# Patient Record
Sex: FEMALE | Race: WHITE | URBAN - METROPOLITAN AREA
[De-identification: names, ages, dates, MRNs, and addresses within clinical notes are randomized per-mention and may not be internally consistent; named-entity substitution may affect disease eponyms.]

---

## 2017-08-22 ENCOUNTER — OFFICE VISIT (OUTPATIENT)
Dept: DERMATOLOGY | Age: 37
End: 2017-08-22

## 2017-08-22 DIAGNOSIS — L81.1 MELASMA: ICD-10-CM

## 2017-08-22 DIAGNOSIS — D22.9 MULTIPLE NEVI: Primary | ICD-10-CM

## 2017-08-22 DIAGNOSIS — D48.5 NEOPLASM OF UNCERTAIN BEHAVIOR OF SKIN: ICD-10-CM

## 2017-08-22 DIAGNOSIS — L70.0 ACNE VULGARIS: ICD-10-CM

## 2017-08-22 PROCEDURE — 99203 OFFICE O/P NEW LOW 30 MIN: CPT | Performed by: DERMATOLOGY

## 2017-08-22 PROCEDURE — 11100 PR BIOPSY OF SKIN LESION: CPT | Performed by: DERMATOLOGY

## 2017-08-22 RX ORDER — DAPSONE 50 MG/G
GEL TOPICAL
Qty: 30 G | Refills: 5 | Status: SHIPPED | OUTPATIENT
Start: 2017-08-22 | End: 2020-02-13

## 2017-08-22 RX ORDER — NORETHINDRONE ACETATE AND ETHINYL ESTRADIOL 1.5-30(21)
KIT ORAL
COMMUNITY
Start: 2017-07-26 | End: 2020-02-13

## 2017-08-22 RX ORDER — SPIRONOLACTONE 100 MG/1
100 TABLET, FILM COATED ORAL DAILY
COMMUNITY
End: 2018-05-15 | Stop reason: SDUPTHER

## 2017-08-22 RX ORDER — ESCITALOPRAM OXALATE 20 MG/1
20 TABLET ORAL DAILY
COMMUNITY

## 2017-08-23 ENCOUNTER — TELEPHONE (OUTPATIENT)
Dept: DERMATOLOGY | Age: 37
End: 2017-08-23

## 2017-08-28 ENCOUNTER — TELEPHONE (OUTPATIENT)
Dept: DERMATOLOGY | Age: 37
End: 2017-08-28

## 2017-11-09 ENCOUNTER — OFFICE VISIT (OUTPATIENT)
Dept: DERMATOLOGY | Age: 37
End: 2017-11-09

## 2017-11-09 DIAGNOSIS — D22.5 COMPOUND NEVUS OF CHEST: Primary | ICD-10-CM

## 2017-11-09 PROCEDURE — MISCSHVSIM COSMETIC SHAVE REMOVAL OF BENIGN LESION - SIMPLE: Performed by: DERMATOLOGY

## 2017-11-09 NOTE — PROGRESS NOTES
Seizures ([facility] [address]      History reviewed. No pertinent surgical history. Physical Examination     Gen, well-appearing    Chest with 2 light brown ~ 4 mm papules pictured below            Assessment and Plan     1. Central chest and R chest  - simple elective shave removal x 2 performed after verbal consent obtained. Patient educated regarding risk of bleeding, infection, scar and educated on wound care. Skin cleansed with alcohol pad and site anesthetized with lido + epi. Aluminum chloride applied to site for hemostasis. Petrolatum ointment and bandage applied. Specimen bottle labeled with patient information and site and specimen sent to [facility].    - limit sun exposure     *aczone to carepoint if needed for acne

## 2017-11-15 ENCOUNTER — TELEPHONE (OUTPATIENT)
Dept: DERMATOLOGY | Age: 37
End: 2017-11-15

## 2018-05-14 ENCOUNTER — TELEPHONE (OUTPATIENT)
Dept: DERMATOLOGY | Age: 38
End: 2018-05-14

## 2018-05-14 DIAGNOSIS — Z51.81 MEDICATION MONITORING ENCOUNTER: Primary | ICD-10-CM

## 2018-05-15 RX ORDER — SPIRONOLACTONE 100 MG/1
100 TABLET, FILM COATED ORAL DAILY
Qty: 90 TABLET | Refills: 0 | Status: SHIPPED | OUTPATIENT
Start: 2018-05-15 | End: 2018-08-21 | Stop reason: SDUPTHER

## 2018-08-20 ENCOUNTER — TELEPHONE (OUTPATIENT)
Dept: DERMATOLOGY | Age: 38
End: 2018-08-20

## 2018-08-21 ENCOUNTER — TELEPHONE (OUTPATIENT)
Dept: DERMATOLOGY | Age: 38
End: 2018-08-21

## 2018-08-21 LAB
ALBUMIN SERPL-MCNC: 4.2 G/DL (ref 3.4–5)
ANION GAP SERPL CALCULATED.3IONS-SCNC: 14 MMOL/L (ref 3–16)
BUN BLDV-MCNC: 11 MG/DL (ref 7–20)
CALCIUM SERPL-MCNC: 9.3 MG/DL (ref 8.3–10.6)
CHLORIDE BLD-SCNC: 102 MMOL/L (ref 99–110)
CO2: 24 MMOL/L (ref 21–32)
CREAT SERPL-MCNC: 0.7 MG/DL (ref 0.6–1.1)
GFR AFRICAN AMERICAN: >60
GFR NON-AFRICAN AMERICAN: >60
GLUCOSE BLD-MCNC: 98 MG/DL (ref 70–99)
PHOSPHORUS: 2.4 MG/DL (ref 2.5–4.9)
POTASSIUM SERPL-SCNC: 4.5 MMOL/L (ref 3.5–5.1)
SODIUM BLD-SCNC: 140 MMOL/L (ref 136–145)

## 2018-08-21 RX ORDER — SPIRONOLACTONE 100 MG/1
100 TABLET, FILM COATED ORAL DAILY
Qty: 90 TABLET | Refills: 1 | Status: SHIPPED | OUTPATIENT
Start: 2018-08-21 | End: 2018-08-27 | Stop reason: SDUPTHER

## 2018-08-24 NOTE — TELEPHONE ENCOUNTER
Mattie Mena said that the spirolactone medication is on back order. [facility] closed out her prescription. She is asking if there is another medication that is similar?

## 2018-08-27 RX ORDER — SPIRONOLACTONE 100 MG/1
100 TABLET, FILM COATED ORAL DAILY
Qty: 30 TABLET | Refills: 1 | Status: SHIPPED | OUTPATIENT
Start: 2018-08-27 | End: 2018-10-22 | Stop reason: SDUPTHER

## 2018-10-22 RX ORDER — SPIRONOLACTONE 100 MG/1
100 TABLET, FILM COATED ORAL DAILY
Qty: 30 TABLET | Refills: 1 | Status: SHIPPED | OUTPATIENT
Start: 2018-10-22 | End: 2018-11-08 | Stop reason: SDUPTHER

## 2018-11-08 ENCOUNTER — OFFICE VISIT (OUTPATIENT)
Dept: DERMATOLOGY | Age: 38
End: 2018-11-08
Payer: COMMERCIAL

## 2018-11-08 DIAGNOSIS — D22.9 MULTIPLE NEVI: Primary | ICD-10-CM

## 2018-11-08 DIAGNOSIS — Z51.81 MEDICATION MONITORING ENCOUNTER: ICD-10-CM

## 2018-11-08 DIAGNOSIS — D22.62: ICD-10-CM

## 2018-11-08 DIAGNOSIS — L70.0 ACNE VULGARIS: ICD-10-CM

## 2018-11-08 PROCEDURE — 11300 SHAVE SKIN LESION 0.5 CM/<: CPT | Performed by: DERMATOLOGY

## 2018-11-08 PROCEDURE — 99214 OFFICE O/P EST MOD 30 MIN: CPT | Performed by: DERMATOLOGY

## 2018-11-08 RX ORDER — SPIRONOLACTONE 100 MG/1
100 TABLET, FILM COATED ORAL DAILY
Qty: 90 TABLET | Refills: 3 | Status: SHIPPED | OUTPATIENT
Start: 2018-11-08 | End: 2019-11-14 | Stop reason: SDUPTHER

## 2018-11-08 RX ORDER — BUPROPION HYDROCHLORIDE 150 MG/1
TABLET ORAL
Refills: 2 | COMMUNITY
Start: 2018-10-15

## 2018-11-12 LAB — DERMATOLOGY PATHOLOGY REPORT: NORMAL

## 2019-11-14 ENCOUNTER — OFFICE VISIT (OUTPATIENT)
Dept: DERMATOLOGY | Age: 39
End: 2019-11-14
Payer: COMMERCIAL

## 2019-11-14 DIAGNOSIS — Z51.81 MEDICATION MONITORING ENCOUNTER: Primary | ICD-10-CM

## 2019-11-14 DIAGNOSIS — R23.8 PAPULE OF SKIN: ICD-10-CM

## 2019-11-14 DIAGNOSIS — D22.9 MULTIPLE NEVI: ICD-10-CM

## 2019-11-14 DIAGNOSIS — L70.0 ACNE VULGARIS: ICD-10-CM

## 2019-11-14 PROCEDURE — 99214 OFFICE O/P EST MOD 30 MIN: CPT | Performed by: DERMATOLOGY

## 2019-11-14 RX ORDER — SPIRONOLACTONE 100 MG/1
100 TABLET, FILM COATED ORAL DAILY
Qty: 90 TABLET | Refills: 2 | Status: SHIPPED | OUTPATIENT
Start: 2019-11-14 | End: 2020-11-23 | Stop reason: SDUPTHER

## 2020-01-15 ENCOUNTER — TELEPHONE (OUTPATIENT)
Dept: DERMATOLOGY | Age: 40
End: 2020-01-15

## 2020-01-22 NOTE — TELEPHONE ENCOUNTER
Patient scheduled to have lesion on neck re-evaluated after treating area with soolantra as suggested.

## 2020-02-13 ENCOUNTER — OFFICE VISIT (OUTPATIENT)
Dept: DERMATOLOGY | Age: 40
End: 2020-02-13
Payer: COMMERCIAL

## 2020-02-13 PROCEDURE — 99212 OFFICE O/P EST SF 10 MIN: CPT | Performed by: DERMATOLOGY

## 2020-02-13 PROCEDURE — 11900 INJECT SKIN LESIONS </W 7: CPT | Performed by: DERMATOLOGY

## 2020-02-13 PROCEDURE — 11104 PUNCH BX SKIN SINGLE LESION: CPT | Performed by: DERMATOLOGY

## 2020-02-13 RX ORDER — TRETINOIN 0.4 MG/G
0.05 GEL TOPICAL NIGHTLY
COMMUNITY

## 2020-02-13 NOTE — PROGRESS NOTES
[facility]  Billy Morel MD  [phone]      Allison Gonzalez  [birth year]    46 y.o. female     Date of Visit: 2/13/2020    Last seen:     Chief Complaint: lesion, acne  Chief Complaint   Patient presents with    Skin Lesion     remove spot right neck    Skin Lesion     spot under left nostril- ILK ? History of Present Illness:    1. Here for a papule on the R neck that has been present for the past few mos, noted at last visit but had been manipulated. Concerned b/c hasn't cleared and she has not been touching it. Asx.     2. F/u for Acne breakouts on the face x many years, mainly chin. On Spironolactone 100 mg daily (has been on consistently for a few years; d/c in past with pregnancies) - helps significantly. See previous note. C/o tender cystic papule in the L infranasal area that flared a week or so ago and has persisted. Hx of melasma. No personal hx of skin cancer. Mom with hx of skin cancer - likely NMSC but not sure of type. Wears sunscreen regularly. Has 2 children - boy and girl - 5 and 3. Probably done having kids. Son started at [address] this year; daughter goes to [employer]. Friends with Esa Koberadha and went to [employer] with [place]. Review of Systems:  Gen: Feels well, good sense of health. Skin: No changing moles or lesions. Past Medical History, Family History, Surgical History, Medications and Allergies reviewed. Outpatient Medications Marked as Taking for the 2/13/20 encounter (Office Visit) with Nicholas Hill MD   Medication Sig Dispense Refill    tretinoin microspheres (RETIN-A MICRO) 0.04 % gel Apply 0.05 % topically nightly Apply topically nightly.       spironolactone (ALDACTONE) 100 MG tablet Take 1 tablet by mouth daily 90 tablet 2    Levonorgestrel (KYLEENA IU) by Intrauterine route      buPROPion (WELLBUTRIN XL) 150 MG extended release tablet TK 1 T PO D  2    escitalopram (LEXAPRO) 20 MG tablet

## 2020-02-17 LAB — DERMATOLOGY PATHOLOGY REPORT: NORMAL

## 2020-09-29 ENCOUNTER — TELEPHONE (OUTPATIENT)
Dept: DERMATOLOGY | Age: 40
End: 2020-09-29

## 2020-09-29 NOTE — TELEPHONE ENCOUNTER
Dr Guillaume Gamez patient  Pt [facility] c/b #[phone]  Pt stated  Is having a bad acne breakout. Pt wanted to know could dr Guillaume Gamez get her in for a cortisone shot.   Please c/b to discuss

## 2020-09-30 NOTE — TELEPHONE ENCOUNTER
Stress is  through the roof currently-chin is the worst started Sunday-Monday. 2 wks    Patient would like to get in for an [address] for the area on her chin tomorrow? I offered patient an appt on 10/6/2020, but she did not want to wait.

## 2020-10-01 ENCOUNTER — OFFICE VISIT (OUTPATIENT)
Dept: DERMATOLOGY | Age: 40
End: 2020-10-01
Payer: COMMERCIAL

## 2020-10-01 VITALS — TEMPERATURE: 97.3 F

## 2020-10-01 PROCEDURE — 99212 OFFICE O/P EST SF 10 MIN: CPT | Performed by: DERMATOLOGY

## 2020-10-01 PROCEDURE — 11900 INJECT SKIN LESIONS </W 7: CPT | Performed by: DERMATOLOGY

## 2020-10-01 NOTE — PROGRESS NOTES
[facility]  Ankit Gupta MD  [phone]      Fidel Holman  [birth year]    45 y.o. female     Date of Visit: 10/1/2020    Last seen:     Chief Complaint: acne  Chief Complaint   Patient presents with    Acne     chin flaring     History of Present Illness:    1. Acne breakouts on the face x many years, mainly chin. On Spironolactone 100 mg daily (has been on consistently for a few years; d/c in past with pregnancies) - helps significantly. C/o tender cystic papules on the L lower chin and R chin that flared a week or so ago and has persisted. Hx of melasma. No personal hx of skin cancer. Mom with hx of skin cancer - likely NMSC but not sure of type. Wears sunscreen regularly. Has 2 children - boy and girl - 10 and 4. Probably done having kids. Son started at [address] in 2019; daughter goes to [employer]. Friends with Martina Darby and went to [employer] with [place]. Review of Systems:  Gen: Feels well, good sense of health. Skin: No changing moles or lesions. Past Medical History, Family History, Surgical History, Medications and Allergies reviewed. Outpatient Medications Marked as Taking for the 10/1/20 encounter (Office Visit) with Casandra Ling MD   Medication Sig Dispense Refill    tretinoin microspheres (RETIN-A MICRO) 0.04 % gel Apply 0.05 % topically nightly Apply topically nightly.  spironolactone (ALDACTONE) 100 MG tablet Take 1 tablet by mouth daily 90 tablet 2    Levonorgestrel (KYLEENA IU) by Intrauterine route      buPROPion (WELLBUTRIN XL) 150 MG extended release tablet TK 1 T PO D  2    escitalopram (LEXAPRO) 20 MG tablet Take 20 mg by mouth daily       No Known Allergies    Past Medical History:   Diagnosis Date    Depression     Seizures ([facility] [address]      History reviewed. No pertinent surgical history.     Physical Examination     Gen, well-appearing    Lower face/perioral area with erythematous variable eroded papules and few papulonodules - R chin and L lower chin            Previous shave sites:          Assessment and Plan     1.  Acne papules - flaring - 2  - IL Kenalog 5 mg/cc   Total 0.15 cc injection to 2 lesions   Ed risk atrophy and dyspigmentation   - cont spirono, tretinoin, aczone     Previously addressed:   - Melasma, mild - triluma  - S/p shave removal of benign nevi on the Central chest and R chest    *aczone to carepoint if needed for acne

## 2020-11-16 ENCOUNTER — OFFICE VISIT (OUTPATIENT)
Dept: DERMATOLOGY | Age: 40
End: 2020-11-16
Payer: COMMERCIAL

## 2020-11-16 VITALS — TEMPERATURE: 97.2 F

## 2020-11-16 PROCEDURE — 99214 OFFICE O/P EST MOD 30 MIN: CPT | Performed by: DERMATOLOGY

## 2020-11-16 NOTE — PROGRESS NOTES
[facility]  Марина Wellington MD  [phone]      Ethan Howard  2/8/1806    45 y.o. female     Date of Visit: 11/16/2020    Last seen:     Chief Complaint: moles, f/u acne  Chief Complaint   Patient presents with    Skin Exam     History of Present Illness:    1. Here for evaluation of multiple asx pigmented lesions on the trunk and extremities, present for many years; no change in size/shape/color of any lesions; no bleeding lesions. 2. F/u for Acne breakouts on the face x many years, mainly chin. Status post intralesional Kenalog for a few large tender cystic nodules a month ago-these have completely resolved. She has a few milder breakouts along the left jawline currently. On Spironolactone 100 mg daily (has been on consistently for a few years; d/c in past with pregnancies) - helps significantly. No side effects. Using tretinoin, aczone. Has tried doxy, juan antonio in the past; BP products, topical clinda. No hx of isotretinoin. On OCP's. Was on depo shot in the past; no association of flares with periods. Hx of melasma. No personal hx of skin cancer. Mom with hx of skin cancer - likely NMSC but not sure of type. Wears sunscreen regularly. Has 2 children - boy and girl - 10 and 4. Probably done having kids. Son started at [address] this year; daughter goes to [employer]. Friends with [facility] and went to [employer] with [place]. Review of Systems:  Gen: Feels well, good sense of health. Skin: No changing moles or lesions. Past Medical History, Family History, Surgical History, Medications and Allergies reviewed. Outpatient Medications Marked as Taking for the 11/16/20 encounter (Office Visit) with Octavio Merida MD   Medication Sig Dispense Refill    tretinoin microspheres (RETIN-A MICRO) 0.04 % gel Apply 0.05 % topically nightly Apply topically nightly.       spironolactone (ALDACTONE) 100 MG tablet Take 1 tablet by mouth daily 90 tablet 2    Levonorgestrel (KYLEENA IU) by Intrauterine route      buPROPion (WELLBUTRIN XL) 150 MG extended release tablet TK 1 T PO D  2    escitalopram (LEXAPRO) 20 MG tablet Take 20 mg by mouth daily       No Known Allergies    Past Medical History:   Diagnosis Date    Depression     Seizures ([facility] [address]      History reviewed. No pertinent surgical history. Physical Examination     Gen, well-appearing    The following were examined and determined to be normal: Psych/Neuro, Scalp/hair, Conjunctivae/eyelids, Gums/teeth/lips, Breast/axilla/chest, Abdomen, Back, RUE, RLE, LLE, Nails/digits and buttocks. LUE. neck. The following were examined and determined to be abnormal: face    trunk and extremities with scattered brown macules and papules   Face clear except for 2 erythematous papules along the left jawline    Scars - subtle, smooth    Previous shave sites:          Assessment and Plan     1. Benign-appearing nevi  - educ re ABCD's of MM   educ sun protection   encouraged skin check yearly (sooner if indicated), self checks     2.  Acne, minimal today  - cont spironolactone 100 mg daily   educ risk K abnl, breast tenderness, dizziness, abnl menses   - cont tretinoin, aczone  - do not get pregnant  - K annually - due now  - consider juan antonio short-term if needed - call if flaring or can see for ILK for acute larger lesions     Previously addressed:  - Melasma, mild - triluma  - S/p shave removal of benign nevi on the Central chest and R chest    *aczone to carepoint if needed for acne

## 2020-11-20 DIAGNOSIS — Z51.81 MEDICATION MONITORING ENCOUNTER: ICD-10-CM

## 2020-11-20 LAB
ALBUMIN SERPL-MCNC: 4.4 G/DL (ref 3.4–5)
ANION GAP SERPL CALCULATED.3IONS-SCNC: 10 MMOL/L (ref 3–16)
BUN BLDV-MCNC: 15 MG/DL (ref 7–20)
CALCIUM SERPL-MCNC: 9.1 MG/DL (ref 8.3–10.6)
CHLORIDE BLD-SCNC: 103 MMOL/L (ref 99–110)
CO2: 24 MMOL/L (ref 21–32)
CREAT SERPL-MCNC: 0.7 MG/DL (ref 0.6–1.1)
GFR AFRICAN AMERICAN: >60
GFR NON-AFRICAN AMERICAN: >60
GLUCOSE BLD-MCNC: 95 MG/DL (ref 70–99)
PHOSPHORUS: 3.8 MG/DL (ref 2.5–4.9)
POTASSIUM SERPL-SCNC: 4.1 MMOL/L (ref 3.5–5.1)
SODIUM BLD-SCNC: 137 MMOL/L (ref 136–145)

## 2020-11-23 RX ORDER — SPIRONOLACTONE 100 MG/1
100 TABLET, FILM COATED ORAL DAILY
Qty: 90 TABLET | Refills: 3 | Status: SHIPPED | OUTPATIENT
Start: 2020-11-23 | End: 2021-11-01

## 2021-02-13 ENCOUNTER — PATIENT MESSAGE (OUTPATIENT)
Dept: DERMATOLOGY | Age: 41
End: 2021-02-13

## 2021-02-15 RX ORDER — MINOCYCLINE HYDROCHLORIDE 50 MG/1
CAPSULE ORAL
Qty: 60 CAPSULE | Refills: 0 | Status: SHIPPED | OUTPATIENT
Start: 2021-02-15

## 2021-02-15 NOTE — TELEPHONE ENCOUNTER
From: Princess Client  To: Joe Joyner MD  Sent: 2/13/2021 8:15 AM EST  Subject: Prescription Question    I am having a very severe cystic acne flare up. Dr. Wil Kothari said I can contact for an oral antibiotic if this happens. Please let me know if this is possible.   THank you,  Josette Bo

## 2021-02-15 NOTE — TELEPHONE ENCOUNTER
11/19/2020:  Acne, minimal today  - cont spironolactone 100 mg daily   educ risk K abnl, breast tenderness, dizziness, abnl menses   - cont tretinoin, aczone  - do not get pregnant  - K annually - due now  - consider juan antonio short-term if needed - call if flaring or can see for ILK for acute larger lesions

## 2021-07-20 ENCOUNTER — OFFICE VISIT (OUTPATIENT)
Dept: DERMATOLOGY | Age: 41
End: 2021-07-20
Payer: COMMERCIAL

## 2021-07-20 VITALS — TEMPERATURE: 97.3 F

## 2021-07-20 DIAGNOSIS — L81.4 LENTIGINES: ICD-10-CM

## 2021-07-20 DIAGNOSIS — D22.9 MULTIPLE NEVI: ICD-10-CM

## 2021-07-20 DIAGNOSIS — L70.0 ACNE VULGARIS: ICD-10-CM

## 2021-07-20 DIAGNOSIS — Z51.81 MEDICATION MONITORING ENCOUNTER: ICD-10-CM

## 2021-07-20 DIAGNOSIS — I83.811 VARICOSE VEINS OF RIGHT LOWER EXTREMITY WITH PAIN: Primary | ICD-10-CM

## 2021-07-20 PROCEDURE — 99214 OFFICE O/P EST MOD 30 MIN: CPT | Performed by: DERMATOLOGY

## 2021-07-20 NOTE — PROGRESS NOTES
[facility]  Jorden Loving MD  [phone]      Kathi Gonzales  [birth year]    37 y.o. female     Date of Visit: 7/20/2021    Last seen:     Chief Complaint: moles, f/u acne  Chief Complaint   Patient presents with    Skin Exam     History of Present Illness:    1. Here for evaluation of multiple asx pigmented lesions on the trunk and extremities, present for many years; no change in size/shape/color of any lesions; no bleeding lesions. 2. F/u for Acne breakouts on the face x many years, mainly chin. Status post intralesional Kenalog for a few large tender cystic nodules in the past-these have completely resolved and no recent. She has a few milder breakouts along the left jawline currently. On Spironolactone 100 mg daily (has been on consistently for a few years; d/c in past with pregnancies) - helps significantly. No side effects. Using tretinoin regularly, aczone intermittently. Has tried doxy, juan antonio in the past; BP products, topical clinda. No hx of isotretinoin. Was On OCP's but now has IUD. Was on depo shot in the past; no association of flares with periods. 3. C/o bulging intermittently achy compressible nodule on the R shin. Bulges more with standing. No trx tried. Hx of melasma. No personal hx of skin cancer. Mom with hx of skin cancer - likely NMSC but not sure of type. Wears sunscreen regularly. Has 2 children - boy and girl. Probably done having kids. Daughter starting at [employer] this year; daughter goes to [employer]. Friends with Jai Alejo and went to [employer] with [place]. Review of Systems:  Gen: Feels well, good sense of health. Skin: No changing moles or lesions. Past Medical History, Family History, Surgical History, Medications and Allergies reviewed.     Outpatient Medications Marked as Taking for the 7/20/21 encounter (Office Visit) with Claudia Serra MD   Medication Sig Dispense Refill   Doss[facility]c Ya spironolactone (ALDACTONE) 100 MG tablet Take 1 tablet by mouth daily 90 tablet 3    Levonorgestrel (KYLEENA IU) by Intrauterine route      buPROPion (WELLBUTRIN XL) 150 MG extended release tablet TK 1 T PO D  2    escitalopram (LEXAPRO) 20 MG tablet Take 20 mg by mouth daily       Allergies   Allergen Reactions    Bactrim [Sulfamethoxazole-Trimethoprim]      H/A, dizziness, flu-like symptoms       Past Medical History:   Diagnosis Date    Depression     Seizures ([facility] [address]      History reviewed. No pertinent surgical history. Physical Examination     Gen, well-appearing    FSE done except for underwear-covered areas    trunk and extremities with scattered brown macules and papules   Face clear except for few small erythematous papules along the left jawline  R lateral shin with compressible ~ 1 cm subtle nodule    Scars - subtle, smooth    Previous shave sites:          Assessment and Plan     1. Benign-appearing nevi and few lentigines  - educ re ABCD's of MM   educ sun protection SPF 30+  encouraged skin check yearly (sooner if indicated), self checks     2. Acne, minimal today  - cont spironolactone 100 mg daily   educ risk K abnl, breast tenderness, dizziness, abnl menses   - cont tretinoin, aczone  - do not get pregnant  - K reviewed - rpt annually - due   - consider juan antonio short-term if needed - call if flaring or can see for ILK for acute larger lesions    3.  C/w early varicosity - R shin, symptomatic  - referral placed to vascular - Dr. Gilles Villatoro or Renee Ramirez for eval/trx  - discussed need for more urgent eval if worsening pain, erythema, swelling, other changes    Also discussed: Angiomas - rtn for [facility] in the winter     Previously addressed:  - Melasma, mild - triluma  - S/p shave removal of benign nevi on the Central chest and R chest     *aczone to carepoint if needed for acne

## 2021-10-15 ENCOUNTER — OFFICE VISIT (OUTPATIENT)
Dept: VASCULAR SURGERY | Age: 41
End: 2021-10-15
Payer: COMMERCIAL

## 2021-10-15 VITALS — HEIGHT: 68 IN | BODY MASS INDEX: 25.01 KG/M2 | WEIGHT: 165 LBS | TEMPERATURE: 97.4 F

## 2021-10-15 DIAGNOSIS — M62.89 HERNIA OF MUSCLE THROUGH FASCIA OF LOWER LEG: Primary | ICD-10-CM

## 2021-10-15 PROCEDURE — 99203 OFFICE O/P NEW LOW 30 MIN: CPT | Performed by: SURGERY

## 2021-10-15 ASSESSMENT — ENCOUNTER SYMPTOMS
CONSTIPATION: 1
RESPIRATORY NEGATIVE: 1
ALLERGIC/IMMUNOLOGIC NEGATIVE: 1
EYES NEGATIVE: 1

## 2021-10-15 NOTE — Clinical Note
Arielle Kumar in our [facility] office today for evaluation of her right leg. On examination she has no evidence of varicosities or spider veins. She does however have a small myofascial defect over the anterior compartment responsible for the bulge identified. I would recommend no further work-up or intervention but I have suggest that she consider wearing over-the-counter compression stockings or sleeve to control it and maintain gentle pressure. She understands will return to see me in future only as needed. If you have any questions please will free to contact me.   Thanks for asked me to see her and please let me know if I can help you with any other patients in the future    Zakia Brady

## 2021-10-15 NOTE — LETTER
[facility]  [facility]  Phone: [phone]  Fax: [phone]    Augusta Boswell MD    October 15, 2021     [address] [place] [id number]    Patient: Pamela Reyes   MR Number: [id number]   YOB: 1980   Date of Visit: 10/15/2021       Dear Syed Faith:    Thank you for referring Pamela Reyes to me for evaluation/treatment. Below are the relevant portions of my assessment and plan of care. Myofascial defect R calf - mild symptomatology  No evidence of venous pathology    Wear OTC compression sleeve of stocking for symptom control and prevention of prpgression. No further vascular workup needed. Discussed and described in detail. Patient understands with all questions answered. If you have questions, please do not hesitate to call me. I look forward to following Selene Carrillo along with you.     Sincerely,      Augusta Boswell MD

## 2021-10-15 NOTE — PROGRESS NOTES
Subjective:      Patient ID: Elin Hart is a 37 y.o. female. HPI Referral from Josselyn Bo MD for evaluation of an area on the right anterior shin area that bulges and is tender to touch. Physician and patient are concerned that this represents varicose vein. Patient denies any achiness or throbbing sensation in that area. Has been present for several years. No previous venous interventions. No family history of varicose veins. No history of SVT, bleeding, ulceration, skin changes or edema. Past Medical History:   Diagnosis Date    Depression     Seizures ([address]      No past surgical history on file. Allergies   Allergen Reactions    Bactrim [Sulfamethoxazole-Trimethoprim]      H/A, dizziness, flu-like symptoms     Current Outpatient Medications   Medication Sig Dispense Refill    minocycline (MINOCIN) 50 MG capsule One po bid with food. (Patient not taking: Reported on 7/20/2021) 60 capsule 0    spironolactone (ALDACTONE) 100 MG tablet Take 1 tablet by mouth daily 90 tablet 3    tretinoin microspheres (RETIN-A MICRO) 0.04 % gel Apply 0.05 % topically nightly Apply topically nightly.  (Patient not taking: Reported on 7/20/2021)      Levonorgestrel (KYLEENA IU) by Intrauterine route      buPROPion (WELLBUTRIN XL) 150 MG extended release tablet TK 1 T PO D  2    escitalopram (LEXAPRO) 20 MG tablet Take 20 mg by mouth daily       Current Facility-Administered Medications   Medication Dose Route Frequency Provider Last Rate Last Admin    triamcinolone acetonide (KENALOG) injection 0.8 mg  0.8 mg Intra-Lesional Once Adelia Guzmán MD        triamcinolone acetonide (KENALOG) injection 0.5 mg  0.5 mg Intra-Lesional Once Adelia Guzmán MD         Social History     Socioeconomic History    Marital status:      Spouse name: Not on file    Number of children: Not on file    Years of education: Not on file    Highest education level: Not on file   Occupational History    Not on file   Tobacco Use    Smoking status: Never Smoker    Smokeless tobacco: Never Used   Substance and Sexual Activity    Alcohol use: Yes     Comment: twice a week    Drug use: No    Sexual activity: Yes     Partners: Male   Other Topics Concern    Not on file   Social History Narrative    Not on file     Social Determinants of Health     Financial Resource Strain:     Difficulty of Paying Living Expenses:    Food Insecurity:     Worried About Running Out of Food in the Last Year:     [address] in the Last Year:    Transportation Needs:     Lack of Transportation (Medical):  Lack of Transportation (Non-Medical):    Physical Activity:     Days of Exercise per Week:     Minutes of Exercise per Session:    Stress:     Feeling of Stress :    Social Connections:     Frequency of Communication with Friends and Family:     Frequency of Social Gatherings with Friends and Family:     Attends [employer] Services:     Active Member of Clubs or Organizations:     Attends Club or Organization Meetings:     Marital Status:    Intimate Partner Violence:     Fear of Current or Ex-Partner:     Emotionally Abused:     Physically Abused:     Sexually Abused:      No family history on file. Review of Systems   Constitutional: Negative. HENT: Negative. Eyes: Negative. Respiratory: Negative. Cardiovascular: Negative. Gastrointestinal: Positive for constipation. Endocrine: Negative. Genitourinary: Negative. Musculoskeletal: Negative. Skin: Negative. Allergic/Immunologic: Negative. Neurological: Negative. Hematological: Negative. Psychiatric/Behavioral: The patient is nervous/anxious. 15 point review systems confirmed personally by this MD    Objective:   Physical Exam  Vitals and nursing note reviewed. Constitutional:       Appearance: She is normal weight. Comments: Attractive   HENT:      Head: Normocephalic and atraumatic.       Right Ear: External ear normal.      Left Ear: External ear normal.      Nose: Nose normal.      Mouth/Throat:      Pharynx: Oropharynx is clear. Eyes:      Extraocular Movements: Extraocular movements intact. Conjunctiva/sclera: Conjunctivae normal.   Cardiovascular:      Rate and Rhythm: Normal rate and regular rhythm. Pulses: Normal pulses. Pulmonary:      Effort: Pulmonary effort is normal.   Abdominal:      General: Abdomen is flat. Musculoskeletal:      Cervical back: Normal range of motion. Right lower leg: No edema. Left lower leg: No edema. Comments: Reducible fascial defect mid-calf level in anterior compartment. Slight tenderness. No venous filling or bluish discoloration. Skin:     General: Skin is warm and dry. Capillary Refill: Capillary refill takes less than 2 seconds. Neurological:      General: No focal deficit present. Mental Status: She is alert and oriented to person, place, and time. Cranial Nerves: No cranial nerve deficit. Sensory: No sensory deficit. Motor: No weakness. Coordination: Coordination normal.      Gait: Gait normal.   Psychiatric:         Mood and Affect: Mood normal.         Behavior: Behavior normal.         Thought Content: Thought content normal.         Judgment: Judgment normal.       R size  L size     Spider  telangiectasias       Reticular veins       Varicose   veins         Assessment:      Myofascial defect R calf - mild symptomatology  No evidence of venous pathology      Plan:      Wear OTC compression sleeve of stocking for symptom control and prevention of prpgression. No further vascular workup needed. Discussed and described in detail. Patient understands with all questions answered.

## 2021-11-01 ENCOUNTER — PROCEDURE VISIT (OUTPATIENT)
Dept: DERMATOLOGY | Age: 41
End: 2021-11-01
Payer: COMMERCIAL

## 2021-11-01 VITALS — TEMPERATURE: 97.7 F

## 2021-11-01 DIAGNOSIS — Z51.81 MEDICATION MONITORING ENCOUNTER: Primary | ICD-10-CM

## 2021-11-01 DIAGNOSIS — D18.01 CHERRY ANGIOMA: ICD-10-CM

## 2021-11-01 PROCEDURE — DM01515 PIGMENTED LASER 11-20 LESIONS: Performed by: DERMATOLOGY

## 2021-11-01 RX ORDER — SPIRONOLACTONE 100 MG/1
100 TABLET, FILM COATED ORAL DAILY
Qty: 90 TABLET | Refills: 3 | Status: SHIPPED | OUTPATIENT
Start: 2021-11-01

## 2021-11-01 NOTE — PROGRESS NOTES
[facility]ermat[facility]  Nalini Miller MD  [phone]      Phylicia Siegelcece  [birth year]    37 y.o. female     Date of Visit: 11/1/2021    Last seen: 7-2021    Chief Complaint: laser  Chief Complaint   Patient presents with    Laser Treatment     History of Present Illness:    Here for removal of angiomas - torso. No previous trx. Hx of melasma. No personal hx of skin cancer. Mom with hx of skin cancer - likely NMSC but not sure of type. Wears sunscreen regularly. Has 2 children - boy and girl. Done having kids. Daughter at [employer] this year; went to [employer]. Friends with Bibiana Felder and went to [employer] with [place]. Review of Systems:  Gen: Feels well, good sense of health. Past Medical History, Family History, Surgical History, Medications and Allergies reviewed. Outpatient Medications Marked as Taking for the 11/1/21 encounter (Procedure visit) with María Toribio MD   Medication Sig Dispense Refill    minocycline (MINOCIN) 50 MG capsule One po bid with food. 60 capsule 0    spironolactone (ALDACTONE) 100 MG tablet Take 1 tablet by mouth daily 90 tablet 3    tretinoin microspheres (RETIN-A MICRO) 0.04 % gel Apply 0.05 % topically nightly Apply topically nightly.  Levonorgestrel (KYLEENA IU) by Intrauterine route      buPROPion (WELLBUTRIN XL) 150 MG extended release tablet TK 1 T PO D  2    escitalopram (LEXAPRO) 20 MG tablet Take 20 mg by mouth daily       Allergies   Allergen Reactions    Bactrim [Sulfamethoxazole-Trimethoprim]      H/A, dizziness, flu-like symptoms       Past Medical History:   Diagnosis Date    Depression     Seizures ([address]      History reviewed. No pertinent surgical history. Physical Examination     Gen, well-appearing   trunk with scattered 1 mm red macules/thin papules    Assessment and Plan     1.  Angiomas   ~20 treated on the trunk and few on the R wrist and R leg    *spirono refilled - taking daily - due for renal by the end of the year - ordered and advised to get done before the end of the year      Laser Procedure Note       [address]:  1980    DATE OF VISIT:  2021  Chief Complaint   Patient presents with    Laser Treatment         LASER: Vbeam  DIAGNOSIS: angiomas    We discussed treatment options, including no treatment as well as the following:  - need for multiple treatments and risk of incomplete clearance, recurrence  - risk of erythema, edema, purpura, dyspigmentation and scarring    PATIENT IDENTIFIED PER PROTOCOL: yes  LOCATION(S): face  VERIFIED AND MARKED: yes  TECHNIQUES, RISKS, BENEFITS AND ALTERNATIVES EXPLAINED: yes  CONSENT SIGNED, WITNESSED AND DATED: yes        OPERATIVE REPORT    DIAGNOSIS, LOCATION, PROCEDURE RECONFIRMED: yes   APPROPRIATE EYE PROTECTION for PATIENT: yes  APPROPRIATE EYE PROTECTION for PROVIDER and OTHERS PRESENT: yes  ANESTHESIA/PRE-OP MEDICATIONS: none  LASER SETTINGS:  (1)  WAVELENGTH: 595  LENS: 7  FLUENCE: 9.5  PULSE DURATION: 1.5  COOLIN/20    PROCEDURE NOTE:  Angiomas treated w single pulses w above settings.     POST-OPERATIVE CARE/DISPOSITION: ice pack prn  COMPLICATIONS: none  MEDICATIONS: none  WOUND CARE INSTRUCTIONS PROVIDED: yes    F/u prn     250

## 2021-11-12 DIAGNOSIS — Z51.81 MEDICATION MONITORING ENCOUNTER: ICD-10-CM

## 2021-11-12 LAB
ALBUMIN SERPL-MCNC: 4.7 G/DL (ref 3.4–5)
ANION GAP SERPL CALCULATED.3IONS-SCNC: 15 MMOL/L (ref 3–16)
BUN BLDV-MCNC: 10 MG/DL (ref 7–20)
CALCIUM SERPL-MCNC: 9.6 MG/DL (ref 8.3–10.6)
CHLORIDE BLD-SCNC: 98 MMOL/L (ref 99–110)
CO2: 24 MMOL/L (ref 21–32)
CREAT SERPL-MCNC: 0.7 MG/DL (ref 0.6–1.1)
GFR AFRICAN AMERICAN: >60
GFR NON-AFRICAN AMERICAN: >60
GLUCOSE BLD-MCNC: 79 MG/DL (ref 70–99)
PHOSPHORUS: 3.5 MG/DL (ref 2.5–4.9)
POTASSIUM SERPL-SCNC: 4.7 MMOL/L (ref 3.5–5.1)
SODIUM BLD-SCNC: 137 MMOL/L (ref 136–145)

## 2022-11-02 RX ORDER — SPIRONOLACTONE 100 MG/1
100 TABLET, FILM COATED ORAL DAILY
Qty: 90 TABLET | Refills: 3 | OUTPATIENT
Start: 2022-11-02

## 2022-11-02 NOTE — TELEPHONE ENCOUNTER
Please let her know that she needs f/u appt since not scheduled. I'll send refill to get her through until f/u if she schedules.

## 2022-11-07 ENCOUNTER — TELEPHONE (OUTPATIENT)
Dept: DERMATOLOGY | Age: 42
End: 2022-11-07

## 2022-11-07 NOTE — TELEPHONE ENCOUNTER
[phone]. Patient made appointment in mid January but was calling about possible refill for Rx  Spironolactone.     Please advise, thank you!!

## 2022-11-16 RX ORDER — SPIRONOLACTONE 100 MG/1
100 TABLET, FILM COATED ORAL DAILY
Qty: 90 TABLET | Refills: 0 | Status: SHIPPED | OUTPATIENT
Start: 2022-11-16

## 2022-12-29 ENCOUNTER — TELEPHONE (OUTPATIENT)
Dept: DERMATOLOGY | Age: 42
End: 2022-12-29

## 2022-12-29 NOTE — TELEPHONE ENCOUNTER
[phone]. Patient is having another cystic acne breakout and is requesting oral antibiotics for it.     Please advise, thank you!!

## 2022-12-29 NOTE — TELEPHONE ENCOUNTER
Patient scheduled for 1/23/2022 but looking for a refill of minocycline before her appointment. Patient's chin is looking not so hot since [place]. Patient due for blood work and is continuing spironolactone. Patient aware Dr. Didi Dia is out office until 1/3/2022.

## 2023-01-05 ENCOUNTER — PROCEDURE VISIT (OUTPATIENT)
Dept: DERMATOLOGY | Age: 43
End: 2023-01-05

## 2023-01-05 DIAGNOSIS — L81.4 LENTIGINES: ICD-10-CM

## 2023-01-05 DIAGNOSIS — D18.01 CHERRY ANGIOMA: ICD-10-CM

## 2023-01-05 DIAGNOSIS — Z51.81 MEDICATION MONITORING ENCOUNTER: ICD-10-CM

## 2023-01-05 DIAGNOSIS — L70.0 ACNE VULGARIS: ICD-10-CM

## 2023-01-05 DIAGNOSIS — I78.1 TELANGIECTASIA: ICD-10-CM

## 2023-01-05 DIAGNOSIS — D22.9 MULTIPLE NEVI: Primary | ICD-10-CM

## 2023-01-05 RX ORDER — MINOCYCLINE HYDROCHLORIDE 50 MG/1
CAPSULE ORAL
Qty: 60 CAPSULE | Refills: 1 | Status: SHIPPED | OUTPATIENT
Start: 2023-01-05

## 2023-01-05 NOTE — PATIENT INSTRUCTIONS
Following the procedure, the treated areas may be red or appear bruised and will likely be swollen for a few hours or up to a few days. The affected areas should be treated delicately following treatment. Discomfort and swelling should be treated with the application of hourly cool compresses the evening of the procedure. Avoid applying ice directly to the skin. If your face was treated, you may want to sleep with your head elevated on an extra pillow to help minimize swelling. Wear sunscreen daily and avoid strenuous activity following rosacea treatments to optimize results. Avoid extra aspirin, ibuprofen, vitamin E following the procedure - this may increase your chance of bruising. Improper care of the treated area while the discoloration is present may increase the chance of scarring or skin textural changes to the treated area.     Please call the office if you have excessive discomfort, herpes simplex virus flare, or burns, blisters, or scabbing.     250.00

## 2023-01-05 NOTE — TELEPHONE ENCOUNTER
Patient offered multiple appointment times today. Patient declined due to multiple meetings that she can not miss. Patient may call back to book the 230 today if her  can  the children from school. Patient returned call and is scheduled today at 1130.

## 2023-01-05 NOTE — PROGRESS NOTES
[facility]  Christena Aschoff, MD  [phone]      Bella Lesches  [birth year]    40 y.o. female     Date of Visit: 1/5/2023    Last seen: 7-2021 for FSE and  for Vbeam    Chief Complaint: moles, f/u acne  Chief Complaint   Patient presents with    Skin Exam    Acne     Flaring on chin    Laser Treatment     History of Present Illness:    1. Here for evaluation of multiple asx pigmented lesions on the trunk and extremities, present for many years; no change in size/shape/color of any lesions; no bleeding lesions. 2. F/u for Acne breakouts on the face x many years, mainly chin. Worse over the past month - flaring on the chin with large, deep and tender lesions. Overall well-controlled prior to this recent flare. Status post intralesional Kenalog for a few large tender cystic nodules in the past-these have completely resolved and no recent. On Spironolactone 100 mg daily (has been on consistently for a few years; d/c in past with pregnancies) - helps significantly. No side effects. Using tretinoin regularly, aczone intermittently in the past.  Has tried doxy, juan antonio in the past; BP products, topical clinda. No hx of isotretinoin. Was On OCP's but now has IUD. Was on depo shot in the past; no association of flares with periods. 3. She would like laser trx for angiomas - tiny ones scattered on the torso, arms, neck. 4. She also has some fine telangiectasias on the cheeks, ala. No previous trx. Would like trx of these for aesthetic improvement. Hx of melasma. No personal hx of skin cancer. Mom with hx of skin cancer - likely NMSC but not sure of type. Wears sunscreen regularly. Has 2 children - boy and girl - 10 and 9. Done having kids. Daughter starting at [employer] this year; daughter went to [employer]. Friends with Mary Kate Blanchard and went to [employer] with [place]. Review of Systems:  Gen: Feels well, good sense of health.   Skin: No changing moles or lesions. Past Medical History, Family History, Surgical History, Medications and Allergies reviewed. Outpatient Medications Marked as Taking for the 1/5/23 encounter (Procedure visit) with Idalia Vora MD   Medication Sig Dispense Refill    spironolactone (ALDACTONE) 100 MG tablet Take 1 tablet by mouth daily 90 tablet 0    tretinoin microspheres (RETIN-A MICRO) 0.04 % gel Apply 0.05 % topically nightly Apply topically nightly. Levonorgestrel (KYLEENA IU) by Intrauterine route      buPROPion (WELLBUTRIN XL) 150 MG extended release tablet TK 1 T PO D  2    escitalopram (LEXAPRO) 20 MG tablet Take 20 mg by mouth daily       Allergies   Allergen Reactions    Bactrim [Sulfamethoxazole-Trimethoprim]      H/A, dizziness, flu-like symptoms       Past Medical History:   Diagnosis Date    Depression     Seizures ([facility] [address]      No past surgical history on file. Physical Examination     Gen, well-appearing    FSE done except for underwear-covered areas    trunk and extremities with scattered brown macules and papules   Face overall clear except for several erythematous papules on the chin - 2 larger and one healing with overlying excoriation/scab  Scattered tiny red macules/papules on the neck, arms, torso  Cheeks and ala with fine telangiectasias and mild erythema    Scars - subtle, smooth    Previous shave sites:          Assessment and Plan     1. Benign-appearing nevi and few lentigines  - Monitor for ABCD's of MM and si/sx of NMSC  Continue sun protection - OTC sunscreen with SPF 30-50+ recommended and reviewed usage  Encouraged skin check yearly (sooner if indicated), self checksks     2.  Acne, focally flaring today  - cont spironolactone 100 mg daily   educ risk K abnl, breast tenderness, dizziness, abnl menses   - cont tretinoin qhs; caution for photosensitivity, irritation  - aczone if needed  - do not get pregnant  - K reviewed - rpt annually - due 8-2022  - add juan antonio short-term if not significantly improving with ILK below; ed GI upset, HA, vis changes  IL Kenalog 5 mg/cc - no extra charge  Total 0.06 cc injection to 2lesions   Ed risk atrophy and dyspigmentation      3, 4. Angiomas and telangiectasias  - Vbeam today - see below     Previously addressed:  - Melasma, mild - triluma  - S/p shave removal of benign nevi on the Central chest and R chest     Laser Procedure Note       Cherie Britt   YOB: 1980    DATE OF VISIT:  2023  Chief Complaint   Patient presents with    Skin Exam    Acne     Flaring on chin    Laser Treatment     LASER: Vbeam  DIAGNOSIS: angioma, telangiectasias    We discussed treatment options, including no treatment as well as the following:  - need for multiple treatments and risk of incomplete clearance, recurrence  - risk of erythema, edema, purpura, dyspigmentation and scarring    PATIENT IDENTIFIED PER PROTOCOL: yes  LOCATION(S): face and trunk  VERIFIED AND MARKED: yes  TECHNIQUES, RISKS, BENEFITS AND ALTERNATIVES EXPLAINED: yes  CONSENT SIGNED, WITNESSED AND DATED: yes        OPERATIVE REPORT    DIAGNOSIS, LOCATION, PROCEDURE RECONFIRMED: yes   APPROPRIATE EYE PROTECTION for PATIENT: yes  APPROPRIATE EYE PROTECTION for PROVIDER and OTHERS PRESENT: yes  ANESTHESIA/PRE-OP MEDICATIONS: none  LASER SETTINGS:  (1)  WAVELENGTH: 595  LENS: 7  FLUENCE: 10 PULSE DURATION: 1.5  COOLIN/20  (2)  WAVELENGTH: 595  LENS: 3x10  FLUENCE: 12  PULSE DURATION: 10 COOLIN/20  (3)  WAVELENGTH: 595  LENS: 10  FLUENCE: 7.5 PULSE DURATION: 10 COOLIN/20    PROCEDURE NOTE:  Individual angiomas treated with single pulses with setting 1. Then telangiectasias treated with 1 pulse with setting 2. Then ala, medial cheeks treated with setting 3 - single pass. POST-OPERATIVE CARE/DISPOSITION: ice pack  COMPLICATIONS: none  MEDICATIONS: none  WOUND CARE INSTRUCTIONS PROVIDED: yes    F/u 1-2 mos prn.     200 (angiomas - decr from 350 last trx since fewer lesions) + 200 (telangiectasias)

## 2023-01-31 RX ORDER — SPIRONOLACTONE 100 MG/1
100 TABLET, FILM COATED ORAL DAILY
Qty: 90 TABLET | Refills: 2 | Status: SHIPPED | OUTPATIENT
Start: 2023-01-31

## 2023-06-08 ENCOUNTER — PATIENT MESSAGE (OUTPATIENT)
Dept: DERMATOLOGY | Age: 43
End: 2023-06-08

## 2023-06-21 ENCOUNTER — PATIENT MESSAGE (OUTPATIENT)
Dept: DERMATOLOGY | Age: 43
End: 2023-06-21

## 2023-06-21 NOTE — TELEPHONE ENCOUNTER
Patient's original concern has seemed to calm down. Patient scheduled to discuss accutane and melasma treatments.

## 2023-06-21 NOTE — TELEPHONE ENCOUNTER
From: Ezra Agudelo  To: Dr. Mclain Acron: 6/21/2023 4:33 PM EDT  Subject: Accutane and sun spots and melasma    I'd like to schedule an appointment to discuss accutane and sunspots/melasma treatment options

## 2023-07-18 ENCOUNTER — PROCEDURE VISIT (OUTPATIENT)
Dept: DERMATOLOGY | Age: 43
End: 2023-07-18
Payer: COMMERCIAL

## 2023-07-18 DIAGNOSIS — L81.1 MELASMA: ICD-10-CM

## 2023-07-18 DIAGNOSIS — L81.9 HYPERPIGMENTATION: ICD-10-CM

## 2023-07-18 DIAGNOSIS — L70.0 ACNE VULGARIS: Primary | ICD-10-CM

## 2023-07-18 PROCEDURE — 99214 OFFICE O/P EST MOD 30 MIN: CPT | Performed by: DERMATOLOGY

## 2023-07-18 RX ORDER — SPIRONOLACTONE 100 MG/1
TABLET, FILM COATED ORAL
Qty: 180 TABLET | Refills: 1 | Status: SHIPPED | OUTPATIENT
Start: 2023-07-18

## 2023-07-18 NOTE — PROGRESS NOTES
[facility]  Angelia Dallas MD  [phone]      Aliya Brian  [birth year]    44 y.o. female     Date of Visit: 7/18/2023    Last seen: 1-2023 for FSE and Vbeam    Chief Complaint: f/u acne, hyperpigmentation  Chief Complaint   Patient presents with    Other     Skin discoloration      Acne     Discuss treatment     History of Present Illness:    1. F/u for Acne breakouts on the face x many years, mainly chin. Worse over the past 6-12 mos - flaring with intermittent large, deep and tender lesions. Status post intralesional Kenalog for a few large tender cystic nodules in the past-these resolved but new appear. On Spironolactone 100 mg daily (has been on consistently for a few years; d/c in past with pregnancies) - helped a lot previously but not as much recently. No side effects. Using tretinoin regularly, aczone intermittently in the past.  Using BP wash. Has tried doxy, juan antonio in the past; BP products, topical clinda. No hx of isotretinoin. Was On OCP's but now has IUD. Was on depo shot in the past; no association of flares with periods. 2. C/o hyperpigmentation on the areas of breakouts + subtle on the cheeks and L upper FH. Has used tretinoin and HQ. Hx of melasma. No personal hx of skin cancer. Mom with hx of skin cancer - likely NMSC but not sure of type. Wears sunscreen regularly. Has 2 children - boy and girl - 10 and 9. Done having kids. Daughter starting at [employer] this year; daughter went to [employer]. Friends with [employer] and went to [employer] with [facility]. Review of Systems:  Gen: Feels well, good sense of health. Skin: No changing moles or lesions. Past Medical History, Family History, Surgical History, Medications and Allergies reviewed.     Outpatient Medications Marked as Taking for the 7/18/23 encounter (Procedure visit) with Elaine Briones MD   Medication Sig Dispense Refill    spironolactone (ALDACTONE) 100 MG

## 2023-12-04 ENCOUNTER — PATIENT MESSAGE (OUTPATIENT)
Dept: DERMATOLOGY | Age: 43
End: 2023-12-04

## 2023-12-04 NOTE — TELEPHONE ENCOUNTER
From: Lucita Prakash  To: Dr. Gianfranco Guerreroiters: 12/4/2023 12:06 PM EST  Subject: Appointment treatment request    I wanted to have some of the small red veins on my face lasered at my next appointment in January and didn't know if I need to request that ahead of time. In the past it's been able to be added the day of, but wanted to confirm.

## 2024-01-23 ENCOUNTER — PROCEDURE VISIT (OUTPATIENT)
Dept: DERMATOLOGY | Age: 44
End: 2024-01-23

## 2024-01-23 DIAGNOSIS — I78.1 TELANGIECTASIA: ICD-10-CM

## 2024-01-23 DIAGNOSIS — D22.9 MULTIPLE NEVI: Primary | ICD-10-CM

## 2024-01-23 DIAGNOSIS — L70.0 ACNE VULGARIS: ICD-10-CM

## 2024-01-23 DIAGNOSIS — L81.4 LENTIGINES: ICD-10-CM

## 2024-01-23 RX ORDER — DULOXETIN HYDROCHLORIDE 30 MG/1
30 CAPSULE, DELAYED RELEASE ORAL DAILY
COMMUNITY
Start: 2023-11-16

## 2024-01-23 NOTE — PROGRESS NOTES
[facility]ermat[facility]  Pham Clarke MD  [phone]      Beverley Adams  1980    43 y.o. female     Date of Visit: 1/23/2024    Last seen: 1-2023 for FSE and Vbeam and more recently for acne f/u    Chief Complaint: moles, f/u acne  Chief Complaint   Patient presents with    Skin Exam    Procedure     History of Present Illness:    1. Here for evaluation of multiple asx pigmented lesions on the trunk and extremities, present for many years; no change in size/shape/color of any lesions; no bleeding lesions.    2. F/u for acne breakouts on the face x many years, mainly chin.  Worse in 2023 - flaring on the chin with large, deep and tender lesions.    Increased spirono to 150 total daily (100 am and 50 pm) and has had improvement with this.    Status post intralesional Kenalog for a few large tender cystic nodules in the past-these have completely resolved and no recent.      No side effects.  Using tretinoin regularly, aczone intermittently in the past.  Has tried doxy, juan antonio in the past; BP products, topical clinda.   No hx of isotretinoin.    Was On OCP's but now has IUD.  Was on depo shot in the past; no association of flares with periods.    3. She would like laser trx for telangiectasias on the ala/chin/cheeks.  Has had angiomas treated in the past.    Hx of melasma.  No personal hx of skin cancer.  Mom with hx of skin cancer - likely NMSC but not sure of type.    Wears sunscreen regularly.    Has 2 children - boy and girl - 6 and 9.  Done having kids.  Kids at [employer].  Friends with Kimberly Ford and went to [email or web address] with [place].    Review of Systems:  Gen: Feels well, good sense of health.  Skin: No changing moles or lesions.     Past Medical History, Family History, Surgical History, Medications and Allergies reviewed.    Outpatient Medications Marked as Taking for the 1/23/24 encounter (Procedure visit) with Pham Clarke MD   Medication Sig Dispense Refill

## 2024-01-24 RX ORDER — BUPROPION HYDROCHLORIDE 300 MG/1
300 TABLET ORAL DAILY
COMMUNITY
Start: 2024-01-16

## 2024-01-24 RX ORDER — SEMAGLUTIDE 0.25 MG/.5ML
0.25 INJECTION, SOLUTION SUBCUTANEOUS
COMMUNITY
Start: 2023-08-17

## 2024-03-01 RX ORDER — MINOCYCLINE HYDROCHLORIDE 50 MG/1
CAPSULE ORAL
Qty: 60 CAPSULE | Refills: 0 | Status: SHIPPED | OUTPATIENT
Start: 2024-03-01

## 2024-04-24 RX ORDER — SPIRONOLACTONE 100 MG/1
TABLET, FILM COATED ORAL
Qty: 180 TABLET | Refills: 1 | Status: SHIPPED | OUTPATIENT
Start: 2024-04-24

## 2024-08-16 RX ORDER — MINOCYCLINE HYDROCHLORIDE 50 MG/1
CAPSULE ORAL
Qty: 60 CAPSULE | Refills: 0 | Status: SHIPPED | OUTPATIENT
Start: 2024-08-16

## 2024-08-16 NOTE — TELEPHONE ENCOUNTER
I send the refill but please check to see how frequently she is taking the minocycline?  Ideally we don't want to leave her on it long-term.

## 2024-10-19 DIAGNOSIS — Z51.81 MEDICATION MONITORING ENCOUNTER: Primary | ICD-10-CM

## 2024-10-23 NOTE — TELEPHONE ENCOUNTER
Yes, please put in an order for a renal panel and let her know to get this done.  I'll send in refills once results back. Doesn't have to be fasting.

## 2024-10-25 DIAGNOSIS — Z51.81 MEDICATION MONITORING ENCOUNTER: ICD-10-CM

## 2024-10-25 LAB
ALBUMIN SERPL-MCNC: 4.4 G/DL (ref 3.4–5)
ANION GAP SERPL CALCULATED.3IONS-SCNC: 10 MMOL/L (ref 3–16)
BUN SERPL-MCNC: 15 MG/DL (ref 7–20)
CALCIUM SERPL-MCNC: 9.6 MG/DL (ref 8.3–10.6)
CHLORIDE SERPL-SCNC: 99 MMOL/L (ref 99–110)
CO2 SERPL-SCNC: 24 MMOL/L (ref 21–32)
CREAT SERPL-MCNC: 0.7 MG/DL (ref 0.6–1.1)
GFR SERPLBLD CREATININE-BSD FMLA CKD-EPI: >90 ML/MIN/{1.73_M2}
GLUCOSE SERPL-MCNC: 88 MG/DL (ref 70–99)
PHOSPHATE SERPL-MCNC: 3.4 MG/DL (ref 2.5–4.9)
POTASSIUM SERPL-SCNC: 4 MMOL/L (ref 3.5–5.1)
SODIUM SERPL-SCNC: 133 MMOL/L (ref 136–145)

## 2024-10-25 RX ORDER — SPIRONOLACTONE 100 MG/1
TABLET, FILM COATED ORAL
Qty: 180 TABLET | Refills: 0 | Status: SHIPPED | OUTPATIENT
Start: 2024-10-25

## 2025-01-24 RX ORDER — SPIRONOLACTONE 100 MG/1
TABLET, FILM COATED ORAL
Qty: 180 TABLET | Refills: 1 | Status: SHIPPED | OUTPATIENT
Start: 2025-01-24

## 2025-01-28 ENCOUNTER — OFFICE VISIT (OUTPATIENT)
Age: 45
End: 2025-01-28
Payer: COMMERCIAL

## 2025-01-28 DIAGNOSIS — L70.0 ACNE VULGARIS: ICD-10-CM

## 2025-01-28 DIAGNOSIS — Z51.81 MEDICATION MONITORING ENCOUNTER: ICD-10-CM

## 2025-01-28 DIAGNOSIS — D18.01 CHERRY ANGIOMA: ICD-10-CM

## 2025-01-28 DIAGNOSIS — L81.4 LENTIGINES: ICD-10-CM

## 2025-01-28 DIAGNOSIS — D22.9 MULTIPLE NEVI: Primary | ICD-10-CM

## 2025-01-28 PROCEDURE — DM01310 VBEAM LASER SMALL OR 2 AREAS: Performed by: DERMATOLOGY

## 2025-01-28 PROCEDURE — 99213 OFFICE O/P EST LOW 20 MIN: CPT | Performed by: DERMATOLOGY

## 2025-01-28 NOTE — PROGRESS NOTES
[facility]at[facility]  Pham Clarke MD  [phone]      Beverley Adams  1980    44 y.o. female     Date of Visit: 1/28/2025    Last seen: 1-2024 for FSE and Vbeam     Chief Complaint: moles, f/u acne  Chief Complaint   Patient presents with    Skin Exam     History of Present Illness:    1. Here for evaluation of multiple asx pigmented lesions on the trunk and extremities, present for many years; no change in size/shape/color of any lesions; no bleeding lesions.    2. F/u for acne breakouts on the face x many years, mainly chin.  Worse in 2023 - flaring on the chin with large, deep and tender lesions.    Increased spirono to 150 total daily (100 am and 50 pm) and has had improvement with this.    Status post intralesional Kenalog for a few large tender cystic nodules in the past-these have completely resolved and no recent.      No side effects.  Using tretinoin regularly, aczone intermittently in the past.  Has tried doxy, juan antonio in the past; BP products, topical clinda.   No hx of isotretinoin.    Was On OCP's but now has IUD.  Was on depo shot in the past; no association of flares with periods.    3. She would like laser trx for few angiomas on the chest/back, abdomen.    Has had Vbeam in the past.    Hx of melasma.  No personal hx of skin cancer.  Mom with hx of skin cancer - likely NMSC but not sure of type.    Wears sunscreen regularly.    Has 2 children - boy and girl - 6 and 9.  Done having kids.  Kids at [employer].  Friends with Kimberly Ford and went to [employer] with [place].    Review of Systems:  Gen: Feels well, good sense of health.  Skin: No changing moles or lesions.     Past Medical History, Family History, Surgical History, Medications and Allergies reviewed.    Outpatient Medications Marked as Taking for the 1/28/25 encounter (Office Visit) with Pham Clarke MD   Medication Sig Dispense Refill    spironolactone (ALDACTONE) 100 MG tablet TAKE 1 TABLET BY MOUTH

## 2025-01-28 NOTE — PATIENT INSTRUCTIONS
Protecting Yourself From the Sun    Apply an over-the-counter broad spectrum water resistant sunscreen with an SPF of at least 30 to exposed areas of the skin. Don’t forget the ears and lips! Remember to reapply sunscreen about every 2 hours and after swimming or sweating.     Wear sun protective clothing. Swim shirts (aka. rash guards) are a great idea and negates the need to reapply sunscreen in those areas.     Seek the shade whenever possible especially between the hours of 10 am and 4 pm when the sun’s rays are the strongest.     Avoid tanning beds         Laser:  Following the procedure, the treated areas may be red or appear bruised and will likely be swollen for a few hours or up to a few days.  The affected areas should be treated delicately following treatment.  Discomfort and swelling should be treated with the application of hourly cool compresses the evening of the procedure.  Avoid applying ice directly to the skin.  If your face was treated, you may want to sleep with your head elevated on an extra pillow to help minimize swelling.    Wear sunscreen daily and avoid strenuous activity following rosacea treatments to optimize results.    Avoid extra aspirin, ibuprofen, vitamin E following the procedure - this may increase your chance of bruising.      Improper care of the treated area while the discoloration is present may increase the chance of scarring or skin textural changes to the treated area.    Please call the office if you have excessive discomfort, herpes simplex virus flare, or burns, blisters, or scabbing.    200

## 2025-05-13 ENCOUNTER — TELEPHONE (OUTPATIENT)
Age: 45
End: 2025-05-13

## 2025-05-13 NOTE — TELEPHONE ENCOUNTER
Pt called has a outbreak of acne on chin.The medication she was given helped some but still has a bad spot. She would like a cortisone shot.    [phone]

## 2025-05-15 ENCOUNTER — OFFICE VISIT (OUTPATIENT)
Age: 45
End: 2025-05-15

## 2025-05-15 DIAGNOSIS — L70.0 ACNE VULGARIS: Primary | ICD-10-CM

## 2025-05-15 NOTE — PROGRESS NOTES
[facility]at[facility]  Pham Clarke MD  [phone]      Beverley Adams  1980    45 y.o. female     Date of Visit: 5/15/2025    Last seen: 1-2025 for FSE and Vbeam     Chief Complaint: acne  Chief Complaint   Patient presents with    Acne     History of Present Illness:    Here for large acne papule/nodule on the R chin x 1-2 week.  Started juan antonio and helped settle other flaring areas but this lesion has persisted.  Tender but no fever chills or other pain.      Acne Worse in 2023 - flaring on the chin with large, deep and tender lesions.    Increased spirono to 150 total daily (100 am and 50 pm) and has had improvement with this.    Status post intralesional Kenalog for a few large tender cystic nodules in the past-these have completely resolved and no recent.      No side effects.  Using tretinoin regularly, aczone intermittently in the past.  Has tried doxy, juan antonio in the past; BP products, topical clinda.   No hx of isotretinoin.    Was On OCP's but now has IUD.  Was on depo shot in the past; no association of flares with periods.    Wears sunscreen regularly.    Has 2 children - boy and girl - 6 and 9.  Done having kids.  Kids at [employer].  Friends with Kimberly Ford and went to [employer] with [employer].    Review of Systems:  Gen: Feels well, good sense of health.  Skin: No changing moles or lesions.     Past Medical History, Family History, Surgical History, Medications and Allergies reviewed.    Outpatient Medications Marked as Taking for the 5/15/25 encounter (Office Visit) with Pham Clarke MD   Medication Sig Dispense Refill    spironolactone (ALDACTONE) 100 MG tablet TAKE 1 TABLET BY MOUTH TWICE DAILY 180 tablet 1    minocycline (MINOCIN;DYNACIN) 50 MG capsule TAKE 1 CAPSULE BY MOUTH TWICE DAILY WITH FOOD 60 capsule 0    buPROPion (WELLBUTRIN XL) 300 MG extended release tablet Take 1 tablet by mouth daily      DULoxetine (CYMBALTA) 30 MG extended release capsule Take 1